# Patient Record
Sex: MALE | Race: WHITE | Employment: OTHER | URBAN - NONMETROPOLITAN AREA
[De-identification: names, ages, dates, MRNs, and addresses within clinical notes are randomized per-mention and may not be internally consistent; named-entity substitution may affect disease eponyms.]

---

## 2020-08-20 ENCOUNTER — HOSPITAL ENCOUNTER (EMERGENCY)
Age: 70
Discharge: HOME | End: 2020-08-20
Payer: MEDICARE

## 2020-08-20 VITALS
TEMPERATURE: 96.9 F | RESPIRATION RATE: 16 BRPM | OXYGEN SATURATION: 96 % | DIASTOLIC BLOOD PRESSURE: 70 MMHG | BODY MASS INDEX: 32.21 KG/M2 | HEIGHT: 70 IN | SYSTOLIC BLOOD PRESSURE: 151 MMHG | HEART RATE: 80 BPM | WEIGHT: 225 LBS

## 2020-08-20 PROCEDURE — 90471 IMMUNIZATION ADMIN: CPT | Performed by: NURSE PRACTITIONER

## 2020-08-20 PROCEDURE — 99203 OFFICE O/P NEW LOW 30 MIN: CPT | Performed by: NURSE PRACTITIONER

## 2020-08-20 PROCEDURE — 6360000002 HC RX W HCPCS: Performed by: NURSE PRACTITIONER

## 2020-08-20 PROCEDURE — 6370000000 HC RX 637 (ALT 250 FOR IP): Performed by: NURSE PRACTITIONER

## 2020-08-20 PROCEDURE — L3933 FO W/O JOINTS CF: HCPCS

## 2020-08-20 PROCEDURE — 90715 TDAP VACCINE 7 YRS/> IM: CPT | Performed by: NURSE PRACTITIONER

## 2020-08-20 PROCEDURE — 99202 OFFICE O/P NEW SF 15 MIN: CPT

## 2020-08-20 PROCEDURE — 2500000003 HC RX 250 WO HCPCS

## 2020-08-20 RX ORDER — LIDOCAINE HYDROCHLORIDE 20 MG/ML
INJECTION, SOLUTION INFILTRATION; PERINEURAL
Status: COMPLETED
Start: 2020-08-20 | End: 2020-08-20

## 2020-08-20 RX ORDER — DIAPER,BRIEF,INFANT-TODD,DISP
EACH MISCELLANEOUS ONCE
Status: COMPLETED | OUTPATIENT
Start: 2020-08-20 | End: 2020-08-20

## 2020-08-20 RX ORDER — CEPHALEXIN 500 MG/1
500 CAPSULE ORAL 4 TIMES DAILY
Qty: 20 CAPSULE | Refills: 0 | Status: SHIPPED | OUTPATIENT
Start: 2020-08-20 | End: 2020-08-25

## 2020-08-20 RX ADMIN — TETANUS TOXOID, REDUCED DIPHTHERIA TOXOID AND ACELLULAR PERTUSSIS VACCINE, ADSORBED 0.5 ML: 5; 2.5; 8; 8; 2.5 SUSPENSION INTRAMUSCULAR at 17:44

## 2020-08-20 RX ADMIN — BACITRACIN ZINC: 500 OINTMENT TOPICAL at 18:45

## 2020-08-20 RX ADMIN — LIDOCAINE HYDROCHLORIDE: 20 INJECTION, SOLUTION INFILTRATION; PERINEURAL at 17:48

## 2020-08-20 SDOH — HEALTH STABILITY: MENTAL HEALTH: HOW OFTEN DO YOU HAVE A DRINK CONTAINING ALCOHOL?: NEVER

## 2020-08-20 ASSESSMENT — ENCOUNTER SYMPTOMS
COLOR CHANGE: 0
NAUSEA: 0
VOMITING: 0

## 2020-08-20 NOTE — ED PROVIDER NOTES
[address]       Chief Complaint   Patient presents with    Laceration     right third digit       Nurses Notes reviewed and I agree except as noted in the HPI. HISTORY OF PRESENT ILLNESS   John Jennings is a 81 y.o. male who presents to the urgent care for evaluation of his right third digit striking it on a drill after he was working on his tractor. He states he was drilling a hole and had draw bar on a farm tractor the drill got stuck and he spun around and hit his right third digit on the platform of the tractor. He denies any foreign body to the laceration. Bleeding controlled upon arrival with pressure and 4 x 4 gauze. He was wearing gloves at the time. Area with minimal bleeding when removing gauze pad. Laceration is crescent moon shape measuring 2-1/2 cm. Skin does lay down on third digit. Normal range of motion. Denies any numbness, tingling, weakness. He is not on any anticoagulants. He is diabetic. He is in need of a tetanus booster. He was getting ready for a tractor pulled tomorrow evening. No over-the-counter medications tried prior to arrival.  Injury occurred 1 hour ago at home. Denies any pain currently. REVIEW OF SYSTEMS     Review of Systems   Constitutional: Negative for activity change, appetite change, chills, diaphoresis, fatigue and fever. Cardiovascular: Negative for chest pain. Gastrointestinal: Negative for nausea and vomiting. Musculoskeletal: Positive for arthralgias and joint swelling. Skin: Positive for wound. Negative for color change, pallor and rash. Allergic/Immunologic: Negative for immunocompromised state. Neurological: Negative for weakness and numbness. Hematological: Does not bruise/bleed easily. Psychiatric/Behavioral: The patient is not nervous/anxious.         PAST MEDICAL HISTORY         Diagnosis Date    Diabetes mellitus ([facility] [address]        SURGICAL HISTORY     Patient  has a past surgical history that includes Mandible fracture surgery; joint replacement; and Nasal sinus surgery. CURRENT MEDICATIONS       Previous Medications    METFORMIN (GLUCOPHAGE) 500 MG TABLET    Take 500 mg by mouth 4 times daily       ALLERGIES     Patient is has No Known Allergies. FAMILY HISTORY     Patient'sfamily history is not on file. SOCIAL HISTORY     Patient  reports that he has never smoked. He has never used smokeless tobacco. He reports that he does not drink alcohol or use drugs. PHYSICAL EXAM     ED TRIAGE VITALS  BP: (!) 161/81, Temp: 96.9 °F (36.1 °C), Pulse: 86, Resp: 16, SpO2: 96 %  Physical Exam  Vitals signs and nursing note reviewed. Constitutional:       General: He is not in acute distress. Appearance: Normal appearance. He is well-developed. He is obese. He is not ill-appearing, toxic-appearing or diaphoretic. HENT:      Head: Normocephalic and atraumatic. Neck:      Musculoskeletal: Normal range of motion and neck supple. No muscular tenderness. Cardiovascular:      Rate and Rhythm: Normal rate and regular rhythm. Pulses: Normal pulses. Radial pulses are 2+ on the right side. Pulmonary:      Effort: Pulmonary effort is normal. No respiratory distress. Breath sounds: Normal breath sounds and air entry. No stridor, decreased air movement or transmitted upper airway sounds. No decreased breath sounds, wheezing, rhonchi or rales. Musculoskeletal:         General: Swelling, tenderness and signs of injury present. Right hand: He exhibits laceration and swelling. He exhibits normal range of motion, no tenderness, no bony tenderness, normal two-point discrimination, normal capillary refill and no deformity. Normal sensation noted. Decreased sensation is not present in the ulnar distribution, is not present in the medial distribution and is not present in the radial distribution. Decreased strength noted. He exhibits finger abduction.  He exhibits no thumb/finger opposition and no wrist extension trouble. Hands:    Skin:     General: Skin is warm and dry. Capillary Refill: Capillary refill takes less than 2 seconds. Coloration: Skin is not jaundiced or pale. Findings: Laceration (2.5 cm laceration right third digit dorsal aspect.  ) present. No abrasion, bruising, ecchymosis, erythema, lesion, petechiae, rash or wound. Neurological:      General: No focal deficit present. Mental Status: He is alert and oriented to person, place, and time. Sensory: Sensation is intact. No sensory deficit. Motor: Motor function is intact. Psychiatric:         Behavior: Behavior is cooperative. DIAGNOSTIC RESULTS   Labs:  Abnormal Labs Reviewed - No data to display     IMAGING:  No orders to display     URGENT CARE COURSE:     Vitals:    08/20/20 1718   BP: (!) 161/81   Pulse: 86   Resp: 16   Temp: 96.9 °F (36.1 °C)   TempSrc: Temporal   SpO2: 96%   Weight: 225 lb (102.1 kg)   Height: 5' 10\" (1.778 m)       Medications   bacitracin zinc ointment (has no administration in time range)   Tetanus-Diphth-Acell Pertussis (BOOSTRIX) injection 0.5 mL (0.5 mLs Intramuscular Given 8/20/20 1744)   lidocaine 2 % injection (  Given 8/20/20 1748)   Lac Repair    Date/Time: 8/20/2020 5:52 PM  Performed by: DIO Ha CNP  Authorized by: DIO Ha CNP     Consent:     Consent obtained:  Written and verbal    Consent given by:  Patient    Risks discussed:  Infection, need for additional repair, nerve damage, poor wound healing and pain    Alternatives discussed:  No treatment  Anesthesia (see MAR for exact dosages):      Anesthesia method:  Local infiltration    Local anesthetic:  Lidocaine 2% w/o epi  Laceration details:     Location:  Finger    Finger location:  R long finger    Length (cm):  2.5 (crescent moon shape dorsal aspect middle of 3rd digit.  )  Repair type:     Repair type: Simple  Pre-procedure details:     Preparation:  Patient was prepped and draped in usual sterile fashion  Exploration:     Hemostasis achieved with:  Direct pressure    Wound exploration: wound explored through full range of motion and entire depth of wound probed and visualized      Wound extent: no areolar tissue violation noted, no fascia violation noted, no foreign bodies/material noted, no muscle damage noted, no nerve damage noted, no tendon damage noted and no vascular damage noted      Contaminated: no    Treatment:     Area cleansed with:  Hibiclens    Amount of cleaning:  Standard    Irrigation solution:  Sterile saline    Irrigation volume:  20 ml    Irrigation method:  Syringe  Skin repair:     Repair method:  Sutures    Suture size:  5-0    Suture material:  Nylon    Suture technique:  Simple interrupted    Number of sutures:  5  Approximation:     Approximation:  Loose  Post-procedure details:     Dressing:  Antibiotic ointment, non-adherent dressing, tube gauze and splint for protection    Patient tolerance of procedure: Tolerated well, no immediate complications      PROCEDURES:  FINALIMPRESSION      1. Laceration of right middle finger without foreign body without damage to nail, initial encounter    2. Accident caused by farm tractor, initial encounter        DISPOSITION/PLAN   DISPOSITION Decision To Discharge 08/20/2020 05:44:26 PM      tdap booster given  Keflex for prophylactic treatment, dirty drill, farm equipment he is diabetic  normal ROM  No neurovascular compromise today. He is afebrile and nontoxic in appearance. Splint to the third digit for protection, mobilization to help with pain and swelling. May remove in the next 24 hours. Keep finger affected finger dry over the next 24 hours may remove dressing in the next 24 to 48 hours keep clean cover only when going to be contaminated or have potential for contamination from dirt or debris. Cover with a dry dressing.   Monitor for redness, pus, increasing swelling. For such symptoms follow-up in urgent care or PCPs office for evaluation. Problem List Items Addressed This Visit     None      Visit Diagnoses     Laceration of right middle finger without foreign body without damage to nail, initial encounter    -  Primary    Relevant Medications    cephALEXin (KEFLEX) 500 MG capsule    Accident caused by farm tractor, initial encounter              PATIENT REFERRED TO:  Sue Benton, DO  [address]  [phone]    Schedule an appointment as soon as possible for a visit in 12 days  12-14 days sooner if skin growing over sutures. , For suture removal      DIO Mckeon CNP, APRN - CNP  08/20/20 2539

## 2020-08-20 NOTE — ED TRIAGE NOTES
Pt ambulatory into esuc with c/o laceration to right third digit. Pt states he was drilling a hole in a draw bar on a farm tractor the drill got stuck and he spun around and hit hand on a platform of tractor. Pt states he was wearing gloves at the time.  Area with minimal bleeding noted  2 cm laceration noted,

## 2020-10-11 ENCOUNTER — HOSPITAL ENCOUNTER (EMERGENCY)
Age: 70
Discharge: HOME | End: 2020-10-11
Attending: EMERGENCY MEDICINE
Payer: MEDICARE

## 2020-10-11 VITALS
RESPIRATION RATE: 16 BRPM | HEART RATE: 78 BPM | SYSTOLIC BLOOD PRESSURE: 122 MMHG | OXYGEN SATURATION: 98 % | DIASTOLIC BLOOD PRESSURE: 78 MMHG | TEMPERATURE: 98.1 F

## 2020-10-11 PROCEDURE — 99211 OFF/OP EST MAY X REQ PHY/QHP: CPT

## 2020-10-11 PROCEDURE — 99213 OFFICE O/P EST LOW 20 MIN: CPT | Performed by: STUDENT IN AN ORGANIZED HEALTH CARE EDUCATION/TRAINING PROGRAM

## 2020-10-11 PROCEDURE — 10120 INC&RMVL FB SUBQ TISS SMPL: CPT | Performed by: EMERGENCY MEDICINE

## 2020-10-11 RX ORDER — LIDOCAINE HYDROCHLORIDE 20 MG/ML
3 INJECTION, SOLUTION INFILTRATION; PERINEURAL ONCE
Status: DISCONTINUED | OUTPATIENT
Start: 2020-10-11 | End: 2020-10-11 | Stop reason: HOSPADM

## 2020-10-11 RX ORDER — CEPHALEXIN 500 MG/1
500 CAPSULE ORAL 3 TIMES DAILY
Qty: 21 CAPSULE | Refills: 0 | Status: SHIPPED | OUTPATIENT
Start: 2020-10-11 | End: 2020-10-18

## 2020-10-11 RX ORDER — ACETAMINOPHEN 650 MG
TABLET, EXTENDED RELEASE ORAL ONCE
Status: DISCONTINUED | OUTPATIENT
Start: 2020-10-11 | End: 2020-10-11 | Stop reason: HOSPADM

## 2020-10-11 ASSESSMENT — PAIN DESCRIPTION - LOCATION: LOCATION: FINGER (COMMENT WHICH ONE)

## 2020-10-11 ASSESSMENT — PAIN DESCRIPTION - ORIENTATION: ORIENTATION: RIGHT

## 2020-10-11 ASSESSMENT — PAIN DESCRIPTION - PAIN TYPE: TYPE: ACUTE PAIN

## 2020-10-11 ASSESSMENT — PAIN SCALES - GENERAL: PAINLEVEL_OUTOF10: 3

## 2020-10-11 NOTE — ED PROVIDER NOTES
[address]  Urgent Care Encounter       CHIEF COMPLAINT       Chief Complaint   Patient presents with    Foreign Body in Skin     r hand ring finger under finger nail       Nurses Notes reviewed and I agree except as noted in the HPI. HISTORY OF PRESENT ILLNESS   Arvind Drake is a 81 y.o. male with past medical history of type 2 diabetes currently on metformin presents with complaint of splinter in ring finger right hand. This happened earlier today while he was picking something up from the floor of a trailer; floor is made of wood. He did attempt to remove splinter on his own however was not successful as splinter is too deep underneath the nail. He reports his pain is 3 out of 10 and limited to tip of affected ring finger. He denies any swelling of nearby joints, discharge drainage or bleeding from the affected finger, fever, chills, nausea, vomiting. Reports he did not apply anything to the finger. He reports his diabetes is fairly well-controlled with most recent A1c between 70%. He does have a PCP. REVIEW OF SYSTEMS     Review of Systems    PAST MEDICAL HISTORY         Diagnosis Date    Diabetes mellitus ([facility] [address]        SURGICALHISTORY     Patient  has a past surgical history that includes Mandible fracture surgery; joint replacement; and Nasal sinus surgery. CURRENT MEDICATIONS       Discharge Medication List as of 10/11/2020  3:31 PM      CONTINUE these medications which have NOT CHANGED    Details   metFORMIN (GLUCOPHAGE) 500 MG tablet Take 500 mg by mouth 4 times dailyHistorical Med             ALLERGIES     Patient is has No Known Allergies. Patients   Immunization History   Administered Date(s) Administered    Tdap (Boostrix, Adacel) 08/20/2020       FAMILY HISTORY     Patient's family history is not on file. SOCIAL HISTORY     Patient  reports that he has never smoked.  He has never used smokeless tobacco. He reports that he does not drink alcohol or use drugs.    PHYSICAL EXAM     ED TRIAGE VITALS  BP: 122/78, Temp: 98.1 °F (36.7 °C), Pulse: 78, Resp: 16, SpO2: 98 %,Estimated body mass index is 32.28 kg/m² as calculated from the following:    Height as of 8/20/20: 5' 10\" (1.778 m). Weight as of 8/20/20: 225 lb (102.1 kg). ,No LMP for male patient. Physical Exam    DIAGNOSTIC RESULTS     Labs:No results found for this visit on 10/11/20. IMAGING:    No orders to display         EKG:      URGENT CARE COURSE:     Vitals:    10/11/20 1444   BP: 122/78   Pulse: 78   Resp: 16   Temp: 98.1 °F (36.7 °C)   TempSrc: Temporal   SpO2: 98%       Medications   lidocaine 2 % injection 3 mL (has no administration in time range)   povidone-iodine (BETADINE) 10 % external solution (has no administration in time range)            PROCEDURES:  Foreign Body    Date/Time: 10/11/2020 3:40 PM  Performed by: Jamey Burton MD  Authorized by: Jeremiah Cunha MD     Consent:     Consent obtained:  Verbal    Consent given by:  Patient    Risks discussed:  Bleeding, infection and pain  Location:     Location:  Finger    Finger location:  R ring finger    Depth:  Subungual    Tendon involvement:  None  Pre-procedure details:     Imaging:  None    Neurovascular status: intact      Preparation: Patient was prepped and draped in usual sterile fashion    Anesthesia (see MAR for exact dosages): Anesthesia method:  Local infiltration    Local anesthetic:  Lidocaine 2% w/o epi  Procedure type:     Procedure complexity:  Simple  Procedure details:     Incision length:  0.5 cm    Localization method:  Visualized    Removal mechanism: Forceps    Foreign bodies recovered:  2    Description:  First an approximately 3-4 mm splinter of wood extracted. Then a deeper remaining 1-2 mm piece of wood removed. All by forceps.   Post-procedure details:     Neurovascular status: intact      Confirmation:  No additional foreign bodies on visualization    Skin closure:  None    Dressing:  Adhesive bandage    Patient tolerance of procedure: Tolerated well, no immediate complications        FINAL IMPRESSION      1. Splinter of finger without major open wound or infection, initial encounter    2. Foreign body of finger of right hand, initial encounter    3. Injury of finger of right hand, initial encounter          DISPOSITION/ PLAN     This is a 73-year-old male with past medical history of type 2 diabetes which is controlled on metformin who presents complaining of splinter underneath the fingernail of the ring finger right hand. Findings as detailed above. Vital signs are stable. Overall clinical picture at this time is consistent with a splinter of finger without major open wound or infection. Procedure completed as detailed above. Patient is prescribed Keflex 500 mg 3 times daily for 7 days for infection prophylaxis. He is otherwise advised to use Tylenol for pain management as needed. Patient is counseled that stress no foreign body in finger and procedure removal may lead to spike in blood sugars transiently; he is advised to check blood sugars regularly and adjust diet accordingly. Printed information regarding his diagnoses are provided to the patient. He is advised to follow-up with his PCP in the next 3 to 5 days for wound recheck. He is advised to present to the ED if symptoms worsen.       PATIENT REFERRED TO:  Marion Hebert DO  [address] [place] [phone] [phone]      DISCHARGE MEDICATIONS:  Discharge Medication List as of 10/11/2020  3:31 PM      START taking these medications    Details   cephALEXin (KEFLEX) 500 MG capsule Take 1 capsule by mouth 3 times daily for 7 days, Disp-21 capsule,R-0Print             Discharge Medication List as of 10/11/2020  3:31 PM          Discharge Medication List as of 10/11/2020  3:31 PM          Ester Duff MD    (Please note that portions of this note were completed with a voice recognition program. Efforts were made to edit the dictations but occasionally words are mis-transcribed.)         Yaz Caro MD  Resident  10/11/20 0979

## 2023-07-01 ENCOUNTER — HOSPITAL ENCOUNTER (EMERGENCY)
Age: 73
Discharge: HOME | End: 2023-07-01
Payer: MEDICARE

## 2023-07-01 VITALS
SYSTOLIC BLOOD PRESSURE: 190 MMHG | DIASTOLIC BLOOD PRESSURE: 90 MMHG | RESPIRATION RATE: 20 BRPM | TEMPERATURE: 98.2 F | OXYGEN SATURATION: 100 % | HEART RATE: 85 BPM

## 2023-07-01 DIAGNOSIS — S01.81XA LACERATION OF FOREHEAD, INITIAL ENCOUNTER: Primary | ICD-10-CM

## 2023-07-01 PROCEDURE — 99213 OFFICE O/P EST LOW 20 MIN: CPT

## 2023-07-01 PROCEDURE — 2500000003 HC RX 250 WO HCPCS: Performed by: NURSE PRACTITIONER

## 2023-07-01 PROCEDURE — 12011 RPR F/E/E/N/L/M 2.5 CM/<: CPT | Performed by: NURSE PRACTITIONER

## 2023-07-01 RX ORDER — CEPHALEXIN 500 MG/1
500 CAPSULE ORAL 3 TIMES DAILY
Qty: 21 CAPSULE | Refills: 0 | Status: SHIPPED | OUTPATIENT
Start: 2023-07-01 | End: 2023-07-08

## 2023-07-01 RX ORDER — LIDOCAINE HYDROCHLORIDE 10 MG/ML
5 INJECTION, SOLUTION INFILTRATION; PERINEURAL ONCE
Status: COMPLETED | OUTPATIENT
Start: 2023-07-01 | End: 2023-07-01

## 2023-07-01 RX ADMIN — LIDOCAINE HYDROCHLORIDE 5 ML: 10 INJECTION, SOLUTION INFILTRATION; PERINEURAL at 18:06

## 2023-07-01 ASSESSMENT — ENCOUNTER SYMPTOMS
EYE PAIN: 0
SHORTNESS OF BREATH: 0
CONSTIPATION: 0
WHEEZING: 0
RHINORRHEA: 0
VOMITING: 0
SINUS PRESSURE: 0
COUGH: 0
BLOOD IN STOOL: 0
ABDOMINAL PAIN: 0
NAUSEA: 0
DIARRHEA: 0
